# Patient Record
Sex: FEMALE | URBAN - METROPOLITAN AREA
[De-identification: names, ages, dates, MRNs, and addresses within clinical notes are randomized per-mention and may not be internally consistent; named-entity substitution may affect disease eponyms.]

---

## 2022-03-30 ENCOUNTER — IMPORTED ENCOUNTER (OUTPATIENT)
Dept: URBAN - METROPOLITAN AREA CLINIC 31 | Facility: CLINIC | Age: 72
End: 2022-03-30

## 2022-03-30 PROBLEM — D31.32: Noted: 2022-03-30

## 2022-03-30 PROBLEM — H25.13: Noted: 2022-03-30

## 2022-03-30 NOTE — PATIENT DISCUSSION
1.  Nuclear Sclerotic Cataract OU:   OS>OD-- Explained how cataracts can effect vision. Recommend clinical observation. The patient was advised to contact us if any change or worsening of vision. 2. Nevus OS: Choroidal lesion meets the criteria specified by the COMS study for a benign lesion. Advise continued observation. Discussed in detail with the patient. The patient voiced understanding. 3.  Gave copy rx 4. RTN 2 mths  DF/OCT mac--FU Nevus OS 5.   RTN 1 yr CE  [facility] [place] only

## 2022-04-02 ASSESSMENT — TONOMETRY
OD_IOP_MMHG: 18
OS_IOP_MMHG: 18

## 2022-04-02 ASSESSMENT — VISUAL ACUITY
OS_SC: 20/50
OS_CC: 20/40-2
OD_CC: 20/30-2
OD_SC: 20/20

## 2022-11-15 NOTE — PATIENT DISCUSSION
DRY on OCT today in office.  obs.   [place] grid at home. MVI/AREDS discussed. Patient to call if any changes in vision or grid card.